# Patient Record
Sex: FEMALE | Race: WHITE
[De-identification: names, ages, dates, MRNs, and addresses within clinical notes are randomized per-mention and may not be internally consistent; named-entity substitution may affect disease eponyms.]

---

## 2018-07-09 ENCOUNTER — HOSPITAL ENCOUNTER (OUTPATIENT)
Dept: HOSPITAL 62 - WI | Age: 73
End: 2018-07-09
Attending: INTERNAL MEDICINE
Payer: MEDICARE

## 2018-07-09 DIAGNOSIS — N39.0: ICD-10-CM

## 2018-07-09 DIAGNOSIS — I10: ICD-10-CM

## 2018-07-09 DIAGNOSIS — E03.9: ICD-10-CM

## 2018-07-09 DIAGNOSIS — Z00.00: Primary | ICD-10-CM

## 2018-07-09 DIAGNOSIS — E78.5: ICD-10-CM

## 2018-07-09 DIAGNOSIS — D50.0: ICD-10-CM

## 2018-07-09 DIAGNOSIS — R91.1: ICD-10-CM

## 2018-07-09 DIAGNOSIS — E21.3: ICD-10-CM

## 2018-07-09 LAB
ADD MANUAL DIFF: NO
ALBUMIN SERPL-MCNC: 4.3 G/DL (ref 3.5–5)
ALP SERPL-CCNC: 100 U/L (ref 38–126)
ALT SERPL-CCNC: 36 U/L (ref 9–52)
ANION GAP SERPL CALC-SCNC: 13 MMOL/L (ref 5–19)
AST SERPL-CCNC: 27 U/L (ref 14–36)
BASOPHILS # BLD AUTO: 0.1 10^3/UL (ref 0–0.2)
BASOPHILS NFR BLD AUTO: 1.4 % (ref 0–2)
BILIRUB DIRECT SERPL-MCNC: 0.4 MG/DL (ref 0–0.4)
BILIRUB SERPL-MCNC: 0.7 MG/DL (ref 0.2–1.3)
BUN SERPL-MCNC: 14 MG/DL (ref 7–20)
CALCIUM: 10.4 MG/DL (ref 8.4–10.2)
CHLORIDE SERPL-SCNC: 107 MMOL/L (ref 98–107)
CHOLEST SERPL-MCNC: 208.67 MG/DL (ref 0–200)
CO2 SERPL-SCNC: 27 MMOL/L (ref 22–30)
EOSINOPHIL # BLD AUTO: 0.3 10^3/UL (ref 0–0.6)
EOSINOPHIL NFR BLD AUTO: 3.7 % (ref 0–6)
ERYTHROCYTE [DISTWIDTH] IN BLOOD BY AUTOMATED COUNT: 13.1 % (ref 11.5–14)
GLUCOSE SERPL-MCNC: 98 MG/DL (ref 75–110)
HCT VFR BLD CALC: 43.9 % (ref 36–47)
HGB BLD-MCNC: 15 G/DL (ref 12–15.5)
LDLC SERPL DIRECT ASSAY-MCNC: 121 MG/DL (ref ?–100)
LYMPHOCYTES # BLD AUTO: 1.7 10^3/UL (ref 0.5–4.7)
LYMPHOCYTES NFR BLD AUTO: 19.9 % (ref 13–45)
MCH RBC QN AUTO: 29.9 PG (ref 27–33.4)
MCHC RBC AUTO-ENTMCNC: 34.2 G/DL (ref 32–36)
MCV RBC AUTO: 87 FL (ref 80–97)
MONOCYTES # BLD AUTO: 0.5 10^3/UL (ref 0.1–1.4)
MONOCYTES NFR BLD AUTO: 6.3 % (ref 3–13)
NEUTROPHILS # BLD AUTO: 5.9 10^3/UL (ref 1.7–8.2)
NEUTS SEG NFR BLD AUTO: 68.7 % (ref 42–78)
PLATELET # BLD: 270 10^3/UL (ref 150–450)
POTASSIUM SERPL-SCNC: 4.5 MMOL/L (ref 3.6–5)
PROT SERPL-MCNC: 7.5 G/DL (ref 6.3–8.2)
RBC # BLD AUTO: 5.03 10^6/UL (ref 3.72–5.28)
SODIUM SERPL-SCNC: 146.8 MMOL/L (ref 137–145)
TOTAL CELLS COUNTED % (AUTO): 100 %
TRIGL SERPL-MCNC: 170 MG/DL (ref ?–150)
URATE SERPL-MCNC: 7.9 MG/DL (ref 2.5–7.5)
VLDLC SERPL CALC-MCNC: 34 MG/DL (ref 10–31)
WBC # BLD AUTO: 8.6 10^3/UL (ref 4–10.5)

## 2018-07-09 PROCEDURE — 85025 COMPLETE CBC W/AUTO DIFF WBC: CPT

## 2018-07-09 PROCEDURE — 93005 ELECTROCARDIOGRAM TRACING: CPT

## 2018-07-09 PROCEDURE — 36415 COLL VENOUS BLD VENIPUNCTURE: CPT

## 2018-07-09 PROCEDURE — 84550 ASSAY OF BLOOD/URIC ACID: CPT

## 2018-07-09 PROCEDURE — 71250 CT THORAX DX C-: CPT

## 2018-07-09 PROCEDURE — 80061 LIPID PANEL: CPT

## 2018-07-09 PROCEDURE — 82330 ASSAY OF CALCIUM: CPT

## 2018-07-09 PROCEDURE — 87086 URINE CULTURE/COLONY COUNT: CPT

## 2018-07-09 PROCEDURE — 84443 ASSAY THYROID STIM HORMONE: CPT

## 2018-07-09 PROCEDURE — 93010 ELECTROCARDIOGRAM REPORT: CPT

## 2018-07-09 PROCEDURE — 82272 OCCULT BLD FECES 1-3 TESTS: CPT

## 2018-07-09 PROCEDURE — 77080 DXA BONE DENSITY AXIAL: CPT

## 2018-07-09 PROCEDURE — 80053 COMPREHEN METABOLIC PANEL: CPT

## 2018-07-09 PROCEDURE — 87186 SC STD MICRODIL/AGAR DIL: CPT

## 2018-07-09 PROCEDURE — 87088 URINE BACTERIA CULTURE: CPT

## 2018-07-09 PROCEDURE — 81001 URINALYSIS AUTO W/SCOPE: CPT

## 2018-07-09 NOTE — EKG REPORT
SEVERITY:- ABNORMAL ECG -

SINUS RHYTHM

LVH WITH SECONDARY REPOLARIZATION ABNORMALITY

ABNORMAL T, PROBABLE ISCHEMIA, LATERAL LEADS

:

Confirmed by: Aury Shane MD 09-Jul-2018 20:20:03

## 2018-07-09 NOTE — RADIOLOGY REPORT (SQ)
EXAM DESCRIPTION:  CT CHEST WITHOUT



COMPLETED DATE/TIME:  7/9/2018 9:52 am



REASON FOR STUDY:  LUNG NODULE (R91.1) E21.3  HYPERPARATHYROIDISM, UNSPECIFIED R91.1  SOLITARY PULMON
ANA CRISTINA NODULE



COMPARISON:  None.



TECHNIQUE:  CT scan performed of the chest without intravenous contrast.  Images reviewed with lung, 
soft tissue and bone windows.  Reconstructed coronal and sagittal MPR images reviewed.  All images st
ored on PACS.

All CT scanners at this facility use dose modulation, iterative reconstruction, and/or weight based d
osing when appropriate to reduce radiation dose to as low as reasonably achievable (ALARA).

[facility]: Dose Right  [facility]: CareDose    [facility]: Dose Right    [facility]: Teradose 4D    [facility]: Smart Technologies



RADIATION DOSE:  CT Rad equipment meets quality standard of care and radiation dose reduction techniq
ues were employed. CTDIvol: 17.7 mGy. DLP: 618 mGy-cm. mGy.



LIMITATIONS:  No technical limitations.



FINDINGS:  LUNGS AND PLEURA:  In the left upper lobe, a 2.6 x 2.2 cm solid nodule.  It lies adjacent 
to the mediastinum but appears to be separate from it.  There are other smaller pulmonary nodules in 
the lungs, with some of the largest measuring 4-5 mm.  A few linear scattered areas of atelectasis or
 scar in the lungs.  No pneumothorax or pleural effusion.  The central airways are clear.

HILAR AND MEDIASTINAL STRUCTURES: No identified masses or abnormal nodes.  No obvious aneurysm.

HEART AND VASCULAR STRUCTURES:  Coronary artery calcifications.  No aneurysm.  No pericardial effusio
n.

UPPER ABDOMEN:  Mildly prominent portacaval and dany hepatis lymph nodes.  Small calcified granuloma
 in the liver, probably secondary to prior granulomatous disease.  Nodular thickening of the left adr
enal gland.  A small 4-5 mm proximal splenic calcified granuloma.  Small hiatal hernia.  Limited exam
ination.

THYROID AND OTHER SOFT TISSUES:  Prior thyroid surgery.

BONES:  Multilevel degenerative changes involving the thoracic spine.

HARDWARE: None in the chest.

OTHER: No other significant findings.



IMPRESSION:  1 A large solid nodule in the left upper lobe, considerations for this finding includes 
neoplasm.  .  There are other smaller pulmonary nodules in the lungs, may represent satellite lesions
.  Correlation with PET/CT scan suggested.

2.  Additional findings as above.



TECHNICAL DOCUMENTATION:  JOB ID:  [id number]

Quality ID # 436: Final reports with documentation of one or more dose reduction techniques (e.g., Au
tomated exposure control, adjustment of the mA and/or kV according to patient size, use of iterative 
reconstruction technique)

 2011 [employer]- All Rights Reserved



Reading location - IP/workstation name: [email or web address]

## 2018-07-09 NOTE — WOMENS IMAGING REPORT
EXAM DESCRIPTION:  BONE DENSITY HIP/SPINE



COMPLETED DATE/TIME:  7/9/2018 9:38 am



REASON FOR STUDY:  HYPERPARATHYROIDISM E21.3  HYPERPARATHYROIDISM, UNSPECIFIED R91.1  SOLITARY PULMON
ANA CRISTINA NODULE



COMPARISON:   None.



TECHNIQUE:  Dual-Energy X-ray Absorptiometry (DEXA) of the AP Spine and Hip.



LIMITATIONS:  None.



FINDINGS:  LUMBAR SPINE:

The bone mineral density (BMD) measured from L1-L4 in the AP projection correlates with a T-score of 
+0.8, which is normal as defined by the World Health Organization.

HIP:

The bone mineral density (BMD) measured in the left femoral neck at the hip correlates with a T-score
 of -1.5, which is osteopenic as defined by the World Health Organization.



IMPRESSION:  1. LUMBAR SPINE: Normal

2.  HIP: Osteopenic



COMMENT:  The World Health Organization defines low BMD as follows:

T-score:

Normal:  Greater than -1.0

Osteopenia: Between -1.0 and -2.5

Osteoporosis:  Less than -2.5 without fractures

Established osteoporosis:  Less than -2.5 with fractures

In general, you may wish to consider:

Diagnosis          Treatment                     Follow-up DEXA

Normal BMD      Prevention                    2-3 years

Osteopenia       Prevention/Therapy        1-2 years

Osteoporosis     Therapy                        Yearly



TECHNICAL DOCUMENTATION:  JOB ID:  [id number]

 2011 [employer]- All Rights Reserved



Reading location - IP/workstation name: [email or web address]

## 2018-07-10 LAB
APPEARANCE UR: (no result)
APTT PPP: YELLOW S
BILIRUB UR QL STRIP: NEGATIVE
GLUCOSE UR STRIP-MCNC: NEGATIVE MG/DL
KETONES UR STRIP-MCNC: NEGATIVE MG/DL
NITRITE UR QL STRIP: POSITIVE
PH UR STRIP: 5 [PH] (ref 5–9)
PROT UR STRIP-MCNC: NEGATIVE MG/DL
SP GR UR STRIP: 1.02
UROBILINOGEN UR-MCNC: NEGATIVE MG/DL (ref ?–2)

## 2018-08-01 ENCOUNTER — HOSPITAL ENCOUNTER (OUTPATIENT)
Dept: HOSPITAL 62 - RAD | Age: 73
End: 2018-08-01
Attending: INTERNAL MEDICINE
Payer: MEDICARE

## 2018-08-01 DIAGNOSIS — I20.0: Primary | ICD-10-CM

## 2018-08-01 PROCEDURE — 93017 CV STRESS TEST TRACING ONLY: CPT

## 2018-08-01 PROCEDURE — 78452 HT MUSCLE IMAGE SPECT MULT: CPT

## 2018-08-01 PROCEDURE — A9500 TC99M SESTAMIBI: HCPCS

## 2018-08-07 NOTE — DRAGON STRESS TEST REPORT
Intravenous Lexiscan Cardiolite stress test using single photon emmision 
computerized tomography.



Date of procedure: 8/1/2018. Ordering Provider: Dr. Roel Long.  

Patient's status: Out Patient.



Indication: Chest pain.  Coronary risk factors: Age, and family history of 
coronary artery disease.



Resting EKG: Sinus Rhythm.  LVH with strain pattern.



Stress EKG: No changes of ischemia.



The patient had no chest pain or discomfort, and there were no arrhythmias seen.



Reason for termination: Protocol.



Conclusions: Normal EKG and hemodynamic response to IV Lexiscan.



Nuclear data:

At rest the patient was given 15.0millicuries of technetium 99m sestamibi 
injected intravenously.  As per protocol rest non gated SPECT images were 
obtained.  Subsequently the patient was given intravenous Lexiscan at a dose of 
0.4 mg in 5 mL intravenously, followed by flush with normal saline.  
Subsequently the stress dose of millicuries of technetium 99m sestamibi was 
injected intravenously.  As per protocol stress gated images were obtained. 



Nuclear interpretation: 



Review of images showed that all segments of the myocardium had normal 
perfusion at rest, and normal perfusion post stress with IV Lexiscan.

All segments of the myocardium had normal motion, contraction, and thickening 
by gated study.

T. I D. ratio was normal at 1.14.  Computer read rest, and stress left 
ventricular ejection fraction were 57 %, and C4 %, respectively.   



Conclusion:



1.  There is no scintigraphic evidence of Lexiscan induced myocardial ischemia.

2.  There is no scintigraphic evidence of myocardial infarction/scar.





Recommendations:

Aggressive risk factor modification, and treating the underlying co- 
morbidities.  

[facility]

## 2018-10-26 ENCOUNTER — HOSPITAL ENCOUNTER (OUTPATIENT)
Dept: HOSPITAL 62 - LAB | Age: 73
End: 2018-10-26
Attending: INTERNAL MEDICINE
Payer: MEDICARE

## 2018-10-26 DIAGNOSIS — M10.9: ICD-10-CM

## 2018-10-26 DIAGNOSIS — E83.52: Primary | ICD-10-CM

## 2018-10-26 DIAGNOSIS — E78.5: ICD-10-CM

## 2018-10-26 LAB
CHOLEST SERPL-MCNC: 212.97 MG/DL (ref 0–200)
LDLC SERPL DIRECT ASSAY-MCNC: 133 MG/DL (ref ?–100)
TRIGL SERPL-MCNC: 147 MG/DL (ref ?–150)
VLDLC SERPL CALC-MCNC: 29 MG/DL (ref 10–31)

## 2018-10-26 PROCEDURE — 82330 ASSAY OF CALCIUM: CPT

## 2018-10-26 PROCEDURE — 80061 LIPID PANEL: CPT

## 2018-10-26 PROCEDURE — 84550 ASSAY OF BLOOD/URIC ACID: CPT

## 2018-10-26 PROCEDURE — 36415 COLL VENOUS BLD VENIPUNCTURE: CPT

## 2018-12-11 ENCOUNTER — HOSPITAL ENCOUNTER (OUTPATIENT)
Dept: HOSPITAL 62 - LAB | Age: 73
End: 2018-12-11
Attending: INTERNAL MEDICINE
Payer: MEDICARE

## 2018-12-11 DIAGNOSIS — E78.5: ICD-10-CM

## 2018-12-11 DIAGNOSIS — M10.9: ICD-10-CM

## 2018-12-11 DIAGNOSIS — E83.52: Primary | ICD-10-CM

## 2018-12-11 LAB
CHOLEST SERPL-MCNC: 206.32 MG/DL (ref 0–200)
LDLC SERPL DIRECT ASSAY-MCNC: 138 MG/DL (ref ?–100)
TRIGL SERPL-MCNC: 132 MG/DL (ref ?–150)
URATE SERPL-MCNC: 7.2 MG/DL (ref 2.5–7.5)
VLDLC SERPL CALC-MCNC: 26 MG/DL (ref 10–31)

## 2018-12-11 PROCEDURE — 84550 ASSAY OF BLOOD/URIC ACID: CPT

## 2018-12-11 PROCEDURE — 80061 LIPID PANEL: CPT

## 2018-12-11 PROCEDURE — 82330 ASSAY OF CALCIUM: CPT

## 2018-12-11 PROCEDURE — 36415 COLL VENOUS BLD VENIPUNCTURE: CPT

## 2019-03-11 ENCOUNTER — HOSPITAL ENCOUNTER (OUTPATIENT)
Dept: HOSPITAL 62 - LAB | Age: 74
End: 2019-03-11
Attending: INTERNAL MEDICINE
Payer: MEDICARE

## 2019-03-11 DIAGNOSIS — E03.9: Primary | ICD-10-CM

## 2019-03-11 DIAGNOSIS — N39.0: ICD-10-CM

## 2019-03-11 DIAGNOSIS — E78.5: ICD-10-CM

## 2019-03-11 DIAGNOSIS — M10.9: ICD-10-CM

## 2019-03-11 LAB
APPEARANCE UR: (no result)
APTT PPP: YELLOW S
BILIRUB UR QL STRIP: NEGATIVE
CHOLEST SERPL-MCNC: 173.36 MG/DL (ref 0–200)
GLUCOSE UR STRIP-MCNC: NEGATIVE MG/DL
KETONES UR STRIP-MCNC: NEGATIVE MG/DL
LDLC SERPL DIRECT ASSAY-MCNC: 107 MG/DL (ref ?–100)
NITRITE UR QL STRIP: NEGATIVE
PH UR STRIP: 5 [PH] (ref 5–9)
PROT UR STRIP-MCNC: NEGATIVE MG/DL
SP GR UR STRIP: 1.02
TRIGL SERPL-MCNC: 94 MG/DL (ref ?–150)
URATE SERPL-MCNC: 6.8 MG/DL (ref 2.5–7.5)
UROBILINOGEN UR-MCNC: NEGATIVE MG/DL (ref ?–2)
VLDLC SERPL CALC-MCNC: 19 MG/DL (ref 10–31)

## 2019-03-11 PROCEDURE — 84443 ASSAY THYROID STIM HORMONE: CPT

## 2019-03-11 PROCEDURE — 81001 URINALYSIS AUTO W/SCOPE: CPT

## 2019-03-11 PROCEDURE — 36415 COLL VENOUS BLD VENIPUNCTURE: CPT

## 2019-03-11 PROCEDURE — 82330 ASSAY OF CALCIUM: CPT

## 2019-03-11 PROCEDURE — 80061 LIPID PANEL: CPT

## 2019-03-11 PROCEDURE — 83970 ASSAY OF PARATHORMONE: CPT

## 2019-03-11 PROCEDURE — 87086 URINE CULTURE/COLONY COUNT: CPT

## 2019-03-11 PROCEDURE — 84550 ASSAY OF BLOOD/URIC ACID: CPT

## 2019-06-12 ENCOUNTER — HOSPITAL ENCOUNTER (OUTPATIENT)
Dept: HOSPITAL 62 - LAB | Age: 74
End: 2019-06-12
Attending: INTERNAL MEDICINE
Payer: MEDICARE

## 2019-06-12 DIAGNOSIS — E78.5: Primary | ICD-10-CM

## 2019-06-12 DIAGNOSIS — E21.3: ICD-10-CM

## 2019-06-12 DIAGNOSIS — R68.89: ICD-10-CM

## 2019-06-12 LAB
ALBUMIN SERPL-MCNC: 4.1 G/DL (ref 3.5–5)
ALP SERPL-CCNC: 82 U/L (ref 38–126)
ALT SERPL-CCNC: 30 U/L (ref 9–52)
ANION GAP SERPL CALC-SCNC: 7 MMOL/L (ref 5–19)
AST SERPL-CCNC: 20 U/L (ref 14–36)
BILIRUB DIRECT SERPL-MCNC: 0.2 MG/DL (ref 0–0.4)
BILIRUB SERPL-MCNC: 0.5 MG/DL (ref 0.2–1.3)
BUN SERPL-MCNC: 15 MG/DL (ref 7–20)
CALCIUM: 10.2 MG/DL (ref 8.4–10.2)
CHLORIDE SERPL-SCNC: 108 MMOL/L (ref 98–107)
CHOLEST SERPL-MCNC: 159.07 MG/DL (ref 0–200)
CO2 SERPL-SCNC: 28 MMOL/L (ref 22–30)
GLUCOSE SERPL-MCNC: 119 MG/DL (ref 75–110)
LDLC SERPL DIRECT ASSAY-MCNC: 90 MG/DL (ref ?–100)
POTASSIUM SERPL-SCNC: 4.4 MMOL/L (ref 3.6–5)
PROT SERPL-MCNC: 7 G/DL (ref 6.3–8.2)
SODIUM SERPL-SCNC: 142.5 MMOL/L (ref 137–145)
TRIGL SERPL-MCNC: 154 MG/DL (ref ?–150)
VLDLC SERPL CALC-MCNC: 30.8 MG/DL (ref 10–31)

## 2019-06-12 PROCEDURE — 80048 BASIC METABOLIC PNL TOTAL CA: CPT

## 2019-06-12 PROCEDURE — 80061 LIPID PANEL: CPT

## 2019-06-12 PROCEDURE — 36415 COLL VENOUS BLD VENIPUNCTURE: CPT

## 2019-06-12 PROCEDURE — 80076 HEPATIC FUNCTION PANEL: CPT

## 2019-09-24 ENCOUNTER — HOSPITAL ENCOUNTER (EMERGENCY)
Dept: HOSPITAL 62 - ER | Age: 74
Discharge: HOME | End: 2019-09-24
Payer: COMMERCIAL

## 2019-09-24 VITALS — DIASTOLIC BLOOD PRESSURE: 62 MMHG | SYSTOLIC BLOOD PRESSURE: 189 MMHG

## 2019-09-24 DIAGNOSIS — Z88.8: ICD-10-CM

## 2019-09-24 DIAGNOSIS — Z91.040: ICD-10-CM

## 2019-09-24 DIAGNOSIS — Z88.5: ICD-10-CM

## 2019-09-24 DIAGNOSIS — S01.81XA: Primary | ICD-10-CM

## 2019-09-24 DIAGNOSIS — R51: ICD-10-CM
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
## 2019-09-24 DIAGNOSIS — Z79.82: ICD-10-CM

## 2019-09-24 PROCEDURE — 99283 EMERGENCY DEPT VISIT LOW MDM: CPT

## 2019-09-24 PROCEDURE — 70450 CT HEAD/BRAIN W/O DYE: CPT

## 2019-09-24 PROCEDURE — 12013 RPR F/E/E/N/L/M 2.6-5.0 CM: CPT

## 2019-09-24 PROCEDURE — 70486 CT MAXILLOFACIAL W/O DYE: CPT

## 2019-09-24 PROCEDURE — 72125 CT NECK SPINE W/O DYE: CPT

## 2019-09-24 RX ADMIN — HEPARIN SODIUM ONE: 1000 INJECTION, SOLUTION INTRAVENOUS; SUBCUTANEOUS at 20:06

## 2019-09-24 RX ADMIN — HEPARIN SODIUM ONE ML: 1000 INJECTION, SOLUTION INTRAVENOUS; SUBCUTANEOUS at 18:09
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
